# Patient Record
Sex: MALE | Race: WHITE | URBAN - METROPOLITAN AREA
[De-identification: names, ages, dates, MRNs, and addresses within clinical notes are randomized per-mention and may not be internally consistent; named-entity substitution may affect disease eponyms.]

---

## 2023-01-04 ENCOUNTER — WEB ENCOUNTER (OUTPATIENT)
Dept: URBAN - METROPOLITAN AREA CLINIC 105 | Facility: CLINIC | Age: 58
End: 2023-01-04

## 2023-01-04 ENCOUNTER — OFFICE VISIT (OUTPATIENT)
Dept: URBAN - METROPOLITAN AREA CLINIC 105 | Facility: CLINIC | Age: 58
End: 2023-01-04
Payer: COMMERCIAL

## 2023-01-04 VITALS
DIASTOLIC BLOOD PRESSURE: 84 MMHG | HEIGHT: 70 IN | HEART RATE: 99 BPM | TEMPERATURE: 97.9 F | SYSTOLIC BLOOD PRESSURE: 128 MMHG | WEIGHT: 166 LBS | BODY MASS INDEX: 23.77 KG/M2

## 2023-01-04 DIAGNOSIS — Z86.010 HISTORY OF COLON POLYPS: ICD-10-CM

## 2023-01-04 DIAGNOSIS — K92.1 HEMATOCHEZIA: ICD-10-CM

## 2023-01-04 DIAGNOSIS — K62.89 ANAL PAIN: ICD-10-CM

## 2023-01-04 DIAGNOSIS — K57.90 DIVERTICULOSIS: ICD-10-CM

## 2023-01-04 PROBLEM — 397881000: Status: ACTIVE | Noted: 2023-01-04

## 2023-01-04 PROCEDURE — 99202 OFFICE O/P NEW SF 15 MIN: CPT | Performed by: INTERNAL MEDICINE

## 2023-01-04 RX ORDER — BISACODYL 5 MG
TAKE 4 TABLET, DELAYED RELEASE (ENTERIC COATED) ORAL
Qty: 4 | OUTPATIENT
Start: 2023-01-04 | End: 2023-01-05

## 2023-01-04 RX ORDER — SODIUM, POTASSIUM,MAG SULFATES 17.5-3.13G
177 ML SOLUTION, RECONSTITUTED, ORAL ORAL
Qty: 1 KIT | Refills: 0 | OUTPATIENT
Start: 2023-01-04 | End: 2023-01-05

## 2023-01-04 NOTE — HPI-TODAY'S VISIT:
Pt comes to arrange a colonoscopy. has had two prior colonoscopies in [place]. first one had no polyps and the 2nd one one polyp was removed. most recent colonoscopy for about 7 years ago. - Pt reports that he has had some issues over the last year of intermittent rectal bleeding. - it has gotten more consistent lately. having about daily bleeding with a bowel movement.

## 2023-01-09 PROBLEM — 428283002: Status: ACTIVE | Noted: 2023-01-04

## 2023-02-14 ENCOUNTER — WEB ENCOUNTER (OUTPATIENT)
Dept: URBAN - METROPOLITAN AREA SURGERY CENTER 16 | Facility: SURGERY CENTER | Age: 58
End: 2023-02-14

## 2023-02-16 ENCOUNTER — TELEPHONE ENCOUNTER (OUTPATIENT)
Dept: URBAN - METROPOLITAN AREA CLINIC 105 | Facility: CLINIC | Age: 58
End: 2023-02-16

## 2023-02-17 ENCOUNTER — CLAIMS CREATED FROM THE CLAIM WINDOW (OUTPATIENT)
Dept: URBAN - METROPOLITAN AREA SURGERY CENTER 16 | Facility: SURGERY CENTER | Age: 58
End: 2023-02-17
Payer: COMMERCIAL

## 2023-02-17 ENCOUNTER — OFFICE VISIT (OUTPATIENT)
Dept: URBAN - METROPOLITAN AREA SURGERY CENTER 16 | Facility: SURGERY CENTER | Age: 58
End: 2023-02-17

## 2023-02-17 DIAGNOSIS — K64.8 EXTERNAL HEMORRHOIDS: ICD-10-CM

## 2023-02-17 DIAGNOSIS — D12.4 ADENOMA OF DESCENDING COLON: ICD-10-CM

## 2023-02-17 DIAGNOSIS — Z86.010 ADENOMAS PERSONAL HISTORY OF COLONIC POLYPS: ICD-10-CM

## 2023-02-17 PROCEDURE — 46221 LIGATION OF HEMORRHOID(S): CPT | Performed by: INTERNAL MEDICINE

## 2023-02-17 PROCEDURE — 45385 COLONOSCOPY W/LESION REMOVAL: CPT | Performed by: INTERNAL MEDICINE

## 2023-02-17 PROCEDURE — G8907 PT DOC NO EVENTS ON DISCHARG: HCPCS | Performed by: INTERNAL MEDICINE

## 2023-03-09 ENCOUNTER — WEB ENCOUNTER (OUTPATIENT)
Dept: URBAN - METROPOLITAN AREA CLINIC 105 | Facility: CLINIC | Age: 58
End: 2023-03-09

## 2023-04-19 ENCOUNTER — OFFICE VISIT (OUTPATIENT)
Dept: URBAN - METROPOLITAN AREA CLINIC 105 | Facility: CLINIC | Age: 58
End: 2023-04-19
Payer: COMMERCIAL

## 2023-04-19 VITALS
WEIGHT: 166.8 LBS | SYSTOLIC BLOOD PRESSURE: 134 MMHG | HEART RATE: 88 BPM | TEMPERATURE: 96.3 F | DIASTOLIC BLOOD PRESSURE: 87 MMHG | HEIGHT: 70 IN | BODY MASS INDEX: 23.88 KG/M2

## 2023-04-19 DIAGNOSIS — R19.7 DIARRHEA, UNSPECIFIED TYPE: ICD-10-CM

## 2023-04-19 DIAGNOSIS — K64.2 GRADE III HEMORRHOIDS: ICD-10-CM

## 2023-04-19 DIAGNOSIS — K92.1 HEMATOCHEZIA: ICD-10-CM

## 2023-04-19 PROCEDURE — 46221 LIGATION OF HEMORRHOID(S): CPT | Performed by: INTERNAL MEDICINE

## 2023-04-19 PROCEDURE — 99212 OFFICE O/P EST SF 10 MIN: CPT | Performed by: INTERNAL MEDICINE

## 2023-04-19 NOTE — HPI-TODAY'S VISIT:
Pt comes to arrange a colonoscopy. has had two prior colonoscopies in [place]. first one had no polyps and the 2nd one one polyp was removed. most recent colonoscopy for about 7 years ago. - Pt reports that he has had some issues over the last year of intermittent rectal bleeding. - it has gotten more consistent lately. having about daily bleeding with a bowel movement. -  04/19/2023:  Patient comes to the office following colonoscopy and hemorrhoid banding.  One small polyp was removed.  His hemorrhoids are much better but still having a little bit of bleeding with stools.  He wants some more hemorrhoid banding done today in the office

## 2023-04-20 ENCOUNTER — LAB OUTSIDE AN ENCOUNTER (OUTPATIENT)
Dept: URBAN - METROPOLITAN AREA CLINIC 105 | Facility: CLINIC | Age: 58
End: 2023-04-20

## 2023-04-26 LAB
ADENOVIRUS F 40/41: NOT DETECTED
CALPROTECTIN, STOOL - QDX: (no result)
CAMPYLOBACTER: NOT DETECTED
CLOSTRIDIUM DIFFICILE: NOT DETECTED
CRYPTOSPORIDIUM: NOT DETECTED
CYCLOSPORA CAYETANESIS: NOT DETECTED
E. COLI O157: (no result)
ENTAMOEBA HISTOLYTICA: NOT DETECTED
ENTAMOEBA HISTOLYTICA: NOT DETECTED
ENTEROAGGREGATIVE E.COLI: NOT DETECTED
ENTEROTOXIGENIC E.COLI: NOT DETECTED
ESCHERICHIA COLI O157: NOT DETECTED
FECAL FAT, QUALITATIVE: (no result)
GIARDIA LAMBIA: NOT DETECTED
NOROVIRUS GI/GII: NOT DETECTED
NOROVIRUS GI/GII: NOT DETECTED
PANCREATICELASTASE ELISA, STOOL: (no result)
ROTAVIRUS A: NOT DETECTED
SHIGA-LIKE TOXIN PRODUCING E.COLI: NOT DETECTED
SHIGELLA SPP. / ENTEROINVASIVE E.COLI: NOT DETECTED
VIBRIO CHOLERAE: NOT DETECTED
VIBRIO PARAHAEMOLYTICUS: NOT DETECTED
VIBRIO SPP.: NOT DETECTED
YERSINIA ENTEROCOLITICA: NOT DETECTED
YERSINIA ENTEROCOLITICA: NOT DETECTED

## 2023-04-27 ENCOUNTER — TELEPHONE ENCOUNTER (OUTPATIENT)
Dept: URBAN - METROPOLITAN AREA CLINIC 35 | Facility: CLINIC | Age: 58
End: 2023-04-27

## 2023-04-27 RX ORDER — PANCRELIPASE 36000; 180000; 114000 [USP'U]/1; [USP'U]/1; [USP'U]/1
TAKE TWO PO THREE TIMES A DAY WITH MEALS AND ONE WITH SNACKS CAPSULE, DELAYED RELEASE PELLETS ORAL AS DIRECTED
Qty: 200 | Refills: 5 | OUTPATIENT
Start: 2023-04-27 | End: 2023-10-24

## 2023-05-03 ENCOUNTER — WEB ENCOUNTER (OUTPATIENT)
Dept: URBAN - METROPOLITAN AREA CLINIC 105 | Facility: CLINIC | Age: 58
End: 2023-05-03

## 2023-06-18 ENCOUNTER — WEB ENCOUNTER (OUTPATIENT)
Dept: URBAN - METROPOLITAN AREA CLINIC 105 | Facility: CLINIC | Age: 58
End: 2023-06-18

## 2023-06-19 ENCOUNTER — WEB ENCOUNTER (OUTPATIENT)
Dept: URBAN - METROPOLITAN AREA CLINIC 105 | Facility: CLINIC | Age: 58
End: 2023-06-19

## 2023-08-02 ENCOUNTER — OFFICE VISIT (OUTPATIENT)
Dept: URBAN - METROPOLITAN AREA CLINIC 105 | Facility: CLINIC | Age: 58
End: 2023-08-02
Payer: COMMERCIAL

## 2023-08-02 VITALS
WEIGHT: 172 LBS | DIASTOLIC BLOOD PRESSURE: 91 MMHG | HEART RATE: 79 BPM | BODY MASS INDEX: 24.62 KG/M2 | TEMPERATURE: 97.4 F | SYSTOLIC BLOOD PRESSURE: 135 MMHG | HEIGHT: 70 IN

## 2023-08-02 DIAGNOSIS — K92.1 HEMATOCHEZIA: ICD-10-CM

## 2023-08-02 DIAGNOSIS — Z86.010 HISTORY OF COLON POLYPS: ICD-10-CM

## 2023-08-02 DIAGNOSIS — K64.2 HEMORRHOIDS THAT PROLAPSE WITH STRAINING AND REQUIRE MANUAL REPLACEMENT BACK INSIDE ANAL CANAL: ICD-10-CM

## 2023-08-02 DIAGNOSIS — K86.81 EXOCRINE PANCREATIC INSUFFICIENCY: ICD-10-CM

## 2023-08-02 PROCEDURE — 99213 OFFICE O/P EST LOW 20 MIN: CPT | Performed by: INTERNAL MEDICINE

## 2023-08-02 RX ORDER — PANCRELIPASE 36000; 180000; 114000 [USP'U]/1; [USP'U]/1; [USP'U]/1
TAKE TWO PO THREE TIMES A DAY WITH MEALS AND ONE WITH SNACKS CAPSULE, DELAYED RELEASE PELLETS ORAL AS DIRECTED
Qty: 200 | Refills: 5 | Status: ACTIVE | COMMUNITY
Start: 2023-04-27 | End: 2023-10-24

## 2023-08-02 NOTE — HPI-TODAY'S VISIT:
Pt comes to arrange a colonoscopy. has had two prior colonoscopies in [place]. first one had no polyps and the 2nd one one polyp was removed. most recent colonoscopy for about 7 years ago. - Pt reports that he has had some issues over the last year of intermittent rectal bleeding. - it has gotten more consistent lately. having about daily bleeding with a bowel movement. -  04/19/2023:  Patient comes to the office following colonoscopy and hemorrhoid banding.  One small polyp was removed.  His hemorrhoids are much better but still having a little bit of bleeding with stools.  He wants some more hemorrhoid banding done today in the office - 8/2/2023: went to [facility] and did okay with his stomach. having less bleeding but less. has ssome bleeding with stools on the tissue.  he has EPI and has responded very well to creon. no more abdominal cramping or gas is better/resolved. has put on some weight.

## 2023-08-04 ENCOUNTER — OFFICE VISIT (OUTPATIENT)
Dept: URBAN - METROPOLITAN AREA MEDICAL CENTER 33 | Facility: MEDICAL CENTER | Age: 58
End: 2023-08-04
Payer: COMMERCIAL

## 2023-08-04 DIAGNOSIS — K64.8 EXTERNAL HEMORRHOIDS: ICD-10-CM

## 2023-08-04 PROCEDURE — 45398 COLONOSCOPY W/BAND LIGATION: CPT | Performed by: INTERNAL MEDICINE

## 2023-08-04 RX ORDER — PANCRELIPASE 36000; 180000; 114000 [USP'U]/1; [USP'U]/1; [USP'U]/1
TAKE TWO PO THREE TIMES A DAY WITH MEALS AND ONE WITH SNACKS CAPSULE, DELAYED RELEASE PELLETS ORAL AS DIRECTED
Qty: 200 | Refills: 5 | Status: ACTIVE | COMMUNITY
Start: 2023-04-27 | End: 2023-10-24

## 2023-08-05 ENCOUNTER — WEB ENCOUNTER (OUTPATIENT)
Dept: URBAN - METROPOLITAN AREA CLINIC 105 | Facility: CLINIC | Age: 58
End: 2023-08-05

## 2023-08-30 ENCOUNTER — OFFICE VISIT (OUTPATIENT)
Dept: URBAN - METROPOLITAN AREA CLINIC 105 | Facility: CLINIC | Age: 58
End: 2023-08-30
Payer: COMMERCIAL

## 2023-08-30 ENCOUNTER — DASHBOARD ENCOUNTERS (OUTPATIENT)
Age: 58
End: 2023-08-30

## 2023-08-30 VITALS
DIASTOLIC BLOOD PRESSURE: 89 MMHG | TEMPERATURE: 97.5 F | WEIGHT: 173.6 LBS | HEIGHT: 70 IN | HEART RATE: 83 BPM | BODY MASS INDEX: 24.85 KG/M2 | SYSTOLIC BLOOD PRESSURE: 135 MMHG

## 2023-08-30 DIAGNOSIS — K92.1 HEMATOCHEZIA: ICD-10-CM

## 2023-08-30 DIAGNOSIS — K64.8 INTERNAL HEMORRHOIDS: ICD-10-CM

## 2023-08-30 DIAGNOSIS — K86.81 EXOCRINE PANCREATIC INSUFFICIENCY: ICD-10-CM

## 2023-08-30 PROCEDURE — 99213 OFFICE O/P EST LOW 20 MIN: CPT | Performed by: INTERNAL MEDICINE

## 2023-08-30 RX ORDER — PANCRELIPASE 36000; 180000; 114000 [USP'U]/1; [USP'U]/1; [USP'U]/1
TAKE TWO PO THREE TIMES A DAY WITH MEALS AND ONE WITH SNACKS CAPSULE, DELAYED RELEASE PELLETS ORAL AS DIRECTED
Qty: 200 | Refills: 5 | Status: ACTIVE | COMMUNITY
Start: 2023-04-27 | End: 2023-10-24

## 2023-08-30 RX ORDER — PANCRELIPASE 36000; 180000; 114000 [USP'U]/1; [USP'U]/1; [USP'U]/1
TAKE TWO PO THREE TIMES A DAY WITH MEALS AND ONE WITH SNACKS CAPSULE, DELAYED RELEASE PELLETS ORAL AS DIRECTED
Qty: 200 | Refills: 11
Start: 2023-04-27 | End: 2024-02-26

## 2023-08-30 NOTE — HPI-TODAY'S VISIT:
Pt comes to arrange a colonoscopy. has had two prior colonoscopies in [place]. first one had no polyps and the 2nd one one polyp was removed. most recent colonoscopy for about 7 years ago. - Pt reports that he has had some issues over the last year of intermittent rectal bleeding. - it has gotten more consistent lately. having about daily bleeding with a bowel movement. -  04/19/2023:  Patient comes to the office following colonoscopy and hemorrhoid banding.  One small polyp was removed.  His hemorrhoids are much better but still having a little bit of bleeding with stools.  He wants some more hemorrhoid banding done today in the office - 8/2/2023: went to [facility] and did okay with his stomach. having less bleeding but less. has ssome bleeding with stools on the tissue.  he has EPI and has responded very well to creon. no more abdominal cramping or gas is better/resolved. has put on some weight. - 8/30/2023: we did flex sig with banding of hemorrhoids. he says that the blood is coming back. he does not think that the banding worked.

## 2023-10-25 ENCOUNTER — TELEPHONE ENCOUNTER (OUTPATIENT)
Dept: URBAN - METROPOLITAN AREA CLINIC 23 | Facility: CLINIC | Age: 58
End: 2023-10-25

## 2024-10-16 ENCOUNTER — OFFICE VISIT (OUTPATIENT)
Dept: URBAN - METROPOLITAN AREA CLINIC 105 | Facility: CLINIC | Age: 59
End: 2024-10-16
Payer: COMMERCIAL

## 2024-10-16 VITALS
HEIGHT: 70 IN | DIASTOLIC BLOOD PRESSURE: 84 MMHG | HEART RATE: 89 BPM | SYSTOLIC BLOOD PRESSURE: 122 MMHG | WEIGHT: 173 LBS | BODY MASS INDEX: 24.77 KG/M2 | TEMPERATURE: 97 F

## 2024-10-16 DIAGNOSIS — K64.8 INTERNAL HEMORRHOIDS: ICD-10-CM

## 2024-10-16 DIAGNOSIS — K86.81 EXOCRINE PANCREATIC INSUFFICIENCY: ICD-10-CM

## 2024-10-16 DIAGNOSIS — K92.1 HEMATOCHEZIA: ICD-10-CM

## 2024-10-16 DIAGNOSIS — Z86.0101 PERSONAL HISTORY OF ADENOMATOUS AND SERRATED COLON POLYPS: ICD-10-CM

## 2024-10-16 PROCEDURE — 99213 OFFICE O/P EST LOW 20 MIN: CPT | Performed by: INTERNAL MEDICINE

## 2024-10-16 RX ORDER — PANCRELIPASE 36000; 180000; 114000 [USP'U]/1; [USP'U]/1; [USP'U]/1
2 TABLETS WITH MEALS, AND 1 TABLET WITH SNACKS CAPSULE, DELAYED RELEASE PELLETS ORAL
Qty: 900 | Refills: 3

## 2024-10-16 RX ORDER — PANCRELIPASE 36000; 180000; 114000 [USP'U]/1; [USP'U]/1; [USP'U]/1
2 TABLETS WITH MEALS, AND 1 TABLET WITH SNACKS CAPSULE, DELAYED RELEASE PELLETS ORAL
Status: ACTIVE | COMMUNITY

## 2024-10-16 NOTE — HPI-TODAY'S VISIT:
Pt comes to arrange a colonoscopy. has had two prior colonoscopies in [place]. first one had no polyps and the 2nd one one polyp was removed. most recent colonoscopy for about 7 years ago. - Pt reports that he has had some issues over the last year of intermittent rectal bleeding. - it has gotten more consistent lately. having about daily bleeding with a bowel movement. -  04/19/2023:  Patient comes to the office following colonoscopy and hemorrhoid banding.  One small polyp was removed.  His hemorrhoids are much better but still having a little bit of bleeding with stools.  He wants some more hemorrhoid banding done today in the office - 8/2/2023: went to [facility] and did okay with his stomach. having less bleeding but less. has ssome bleeding with stools on the tissue.  he has EPI and has responded very well to creon. no more abdominal cramping or gas is better/resolved. has put on some weight. - 8/30/2023: we did flex sig with banding of hemorrhoids. he says that the blood is coming back. he does not think that the banding worked. - 10/16/2024 Pt presents for f/u. At last visit, he was referred to colorectal surgery d/t persisting symptoms of hemorrhoids. He was continued on Creon. Today, pt states he is looking for refill on his Creon. He is taking Creon 36,000, 2 with meals and 1 with snacks. States he was feeling bad for years - had diarrhea and abdominal pain and feeling "swollen" in abdomen. States he was tested for pancreatic insufficiency and it was positive. When he started on Creon, states he immediately felt better. He does not have risk factors for pancreatic insufficiency. He is wondering if he needs to take this for the rest of his life. He states he saw colorectal surgery and had hemorrhoidectomy and fissure surgery. He has been taking Colace with every meal since his last procedure. He has 1 BM daily that are soft. He is wondering if it is safe to take Colace long term.

## 2025-07-02 ENCOUNTER — OFFICE VISIT (OUTPATIENT)
Dept: URBAN - METROPOLITAN AREA CLINIC 105 | Facility: CLINIC | Age: 60
End: 2025-07-02
Payer: COMMERCIAL

## 2025-07-02 ENCOUNTER — LAB OUTSIDE AN ENCOUNTER (OUTPATIENT)
Dept: URBAN - METROPOLITAN AREA CLINIC 105 | Facility: CLINIC | Age: 60
End: 2025-07-02

## 2025-07-02 DIAGNOSIS — K64.8 INTERNAL HEMORRHOIDS: ICD-10-CM

## 2025-07-02 DIAGNOSIS — R19.7 DIARRHEA, UNSPECIFIED TYPE: ICD-10-CM

## 2025-07-02 DIAGNOSIS — Z86.0101 PERSONAL HISTORY OF ADENOMATOUS AND SERRATED COLON POLYPS: ICD-10-CM

## 2025-07-02 PROCEDURE — 99214 OFFICE O/P EST MOD 30 MIN: CPT | Performed by: INTERNAL MEDICINE

## 2025-07-02 RX ORDER — PANCRELIPASE 36000; 180000; 114000 [USP'U]/1; [USP'U]/1; [USP'U]/1
2 TABLETS WITH MEALS, AND 1 TABLET WITH SNACKS CAPSULE, DELAYED RELEASE PELLETS ORAL
Qty: 900 | Refills: 3 | Status: ACTIVE | COMMUNITY

## 2025-07-02 NOTE — HPI-TODAY'S VISIT:
He was previously seen by WILLIAM Chan.  Today, he says he noted a dramatic improvement on Creon with softer stools, the inability to gain weight, bloating, gassiness, diffuse abdominal discomfort ("pinpoint" sensations). He started on Creon 2-3 years ago. Currently, he has 1-5 BMs/day. Stools are formed currently, but has had diarrhea intermittent on the Creon. Over the last 2-3 years while on the Creon, he's had watery diarrhea 10 days/month and a softer stool 15-20 days/month. He has never forgotten Creon. He takes 2 pills/meal, 1/snack.  Labs 4/20/23 - Fecal calprotectin 177, fecal elastase 59.33. Fecal fat normal. Stool study negative.

## 2025-07-03 LAB
A/G RATIO: 2
ABSOLUTE BASOPHILS: 7
ABSOLUTE EOSINOPHILS: 57
ABSOLUTE LYMPHOCYTES: 1917
ABSOLUTE MONOCYTES: 376
ABSOLUTE NEUTROPHILS: 4743
ALBUMIN: 4.7
ALKALINE PHOSPHATASE: 69
ALT (SGPT): 29
AST (SGOT): 23
BASOPHILS: 0.1
BILIRUBIN, TOTAL: 0.4
BUN/CREATININE RATIO: (no result)
BUN: 20
CALCIUM: 9.6
CARBON DIOXIDE, TOTAL: 25
CHLORIDE: 103
CREATININE: 1.19
EGFR: 70
EOSINOPHILS: 0.8
GLOBULIN, TOTAL: 2.3
GLUCOSE: 97
HEMATOCRIT: 49.3
HEMOGLOBIN: 16.2
IMMUNOGLOBULIN A, QN, SERUM: 210
LYMPHOCYTES: 27
MCH: 29.1
MCHC: 32.9
MCV: 88.7
MONOCYTES: 5.3
MPV: 9.4
NEUTROPHILS: 66.8
PLATELET COUNT: 210
POTASSIUM: 4.2
PROTEIN, TOTAL: 7
RDW: 12.6
RED BLOOD CELL COUNT: 5.56
SODIUM: 139
T-TRANSGLUTAMINASE (TTG) IGA: <1
TSH W/REFLEX TO FT4: 1.61
WHITE BLOOD CELL COUNT: 7.1

## 2025-07-14 ENCOUNTER — LAB OUTSIDE AN ENCOUNTER (OUTPATIENT)
Dept: URBAN - METROPOLITAN AREA CLINIC 105 | Facility: CLINIC | Age: 60
End: 2025-07-14

## 2025-07-16 ENCOUNTER — LAB OUTSIDE AN ENCOUNTER (OUTPATIENT)
Dept: URBAN - METROPOLITAN AREA CLINIC 105 | Facility: CLINIC | Age: 60
End: 2025-07-16

## 2025-07-17 LAB
ADENOVIRUS F 40/41: NOT DETECTED
CAMPYLOBACTER: NOT DETECTED
CLOSTRIDIUM DIFFICILE: NOT DETECTED
ENTAMOEBA HISTOLYTICA: NOT DETECTED
ENTEROAGGREGATIVE E.COLI: NOT DETECTED
ENTEROTOXIGENIC E.COLI: NOT DETECTED
ESCHERICHIA COLI O157: NOT DETECTED
GIARDIA LAMBLIA: NOT DETECTED
NOROVIRUS GI/GII: NOT DETECTED
ROTAVIRUS A: NOT DETECTED
SALMONELLA SPP.: NOT DETECTED
SHIGA-LIKE TOXIN PRODUCING E.COLI: NOT DETECTED
SHIGELLA SPP. / ENTEROINVASIVE E.COLI: NOT DETECTED
VIBRIO PARAHAEMOLYTICUS: NOT DETECTED
VIBRIO SPP.: NOT DETECTED
YERSINIA ENTEROCOLITICA: NOT DETECTED

## 2025-07-23 LAB — OVA AND PARASITES, CONC/PERM SMEAR, 3 SPEC: (no result)
